# Patient Record
(demographics unavailable — no encounter records)

---

## 2025-07-11 NOTE — DISCUSSION/SUMMARY
[FreeTextEntry1] : 70 y/o M with PMH glaucoma, kidney stones s/p removal, BPH, hernia s/p surgical repair, hemorrhoids s/p surgery, HTN, obesity presents for initial visit. Planned for upcoming cryoablation of L renal mass and is requesting perioperative risk stratification.   #Perioperative risk stratification  Patient with 3/6 systolic murmur, no prior TTE available for review TTE ordered  #HTN Uncontrolled Switch lisinopril to losartan 100mg daily Add HCTZ 12.5mg Patient advised to monitor ambulatory BP and bring log to next visit  TTE to assess LV function DASH diet discussed Check BMP  #Obesity  BMI 30 Diet, lifestyle modifications Check lipid panel  [EKG obtained to assist in diagnosis and management of assessed problem(s)] : EKG obtained to assist in diagnosis and management of assessed problem(s)

## 2025-07-11 NOTE — PHYSICAL EXAM
[Well Developed] : well developed [Well Nourished] : well nourished [No Acute Distress] : no acute distress [Normal Venous Pressure] : normal venous pressure [No Carotid Bruit] : no carotid bruit [Normal S1, S2] : normal S1, S2 [No Rub] : no rub [No Gallop] : no gallop [Clear Lung Fields] : clear lung fields [Good Air Entry] : good air entry [No Respiratory Distress] : no respiratory distress  [Soft] : abdomen soft [Non Tender] : non-tender [No Masses/organomegaly] : no masses/organomegaly [Normal Bowel Sounds] : normal bowel sounds [Normal Gait] : normal gait [No Edema] : no edema [Moves all extremities] : moves all extremities [No Focal Deficits] : no focal deficits [Normal Speech] : normal speech [Alert and Oriented] : alert and oriented [Normal memory] : normal memory [de-identified] : +3/6 systolic murmur loudest at LUSB

## 2025-07-11 NOTE — HISTORY OF PRESENT ILLNESS
[FreeTextEntry1] : 70 y/o M with PMH glaucoma, kidney stones s/p removal, BPH, hernia s/p surgical repair, hemorrhoids s/p surgery, HTN, presents for initial visit. He is planned for a cryoablation for a L renal mass and was sent for perioperative risk stratification. Patient denies cardiac history. He is currently in his usual state of health. Denies CP, dyspnea, palpitations, dizziness, lightheadedness, LE edema, syncope or near syncope. Able to climb 1 flight of stairs without dyspnea, but reports effort tolerance is limited due to leg pain. BP today 140/98, confirmed on manual repeat. He reports compliance with lisinopril 20mg bid. Does not monitor ambulatory BP.  Family Hx: denies premature CAD or sudden cardiac death   Social Hx: denies smoking, ETOH, or illicit drug use. Retired . Does not exercise.